# Patient Record
Sex: MALE | Race: WHITE | NOT HISPANIC OR LATINO | Employment: FULL TIME | URBAN - METROPOLITAN AREA
[De-identification: names, ages, dates, MRNs, and addresses within clinical notes are randomized per-mention and may not be internally consistent; named-entity substitution may affect disease eponyms.]

---

## 2023-01-22 ENCOUNTER — HOSPITAL ENCOUNTER (EMERGENCY)
Facility: HOSPITAL | Age: 38
Discharge: HOME | End: 2023-01-22
Attending: EMERGENCY MEDICINE | Admitting: EMERGENCY MEDICINE
Payer: COMMERCIAL

## 2023-01-22 VITALS
TEMPERATURE: 98.5 F | HEIGHT: 70 IN | BODY MASS INDEX: 30.78 KG/M2 | SYSTOLIC BLOOD PRESSURE: 135 MMHG | DIASTOLIC BLOOD PRESSURE: 68 MMHG | WEIGHT: 215 LBS | OXYGEN SATURATION: 98 % | HEART RATE: 69 BPM | RESPIRATION RATE: 18 BRPM

## 2023-01-22 DIAGNOSIS — Z98.890 HISTORY OF ACHILLES TENDON REPAIR: ICD-10-CM

## 2023-01-22 DIAGNOSIS — M79.672 PAIN OF LEFT HEEL: Primary | ICD-10-CM

## 2023-01-22 DIAGNOSIS — Z47.89 CAST DISCOMFORT: ICD-10-CM

## 2023-01-22 PROCEDURE — 99282 EMERGENCY DEPT VISIT SF MDM: CPT

## 2023-01-22 NOTE — DISCHARGE INSTRUCTIONS
Follow up with ortho in the a.m.    Continue your current pain meds.    Loosen splint as discussed.

## 2023-01-22 NOTE — ED PROVIDER NOTES
Subjective   History of Present Illness  Eyal Alaniz is a 37 yr old male presents emergency department for complaints of discomfort to his left heel.  Patient advises he had left Achilles surgery on January 12.  Surgical procedure was performed per Dr. Haley at [facility] orthopedics.  Patient complains of pain in his heel he describes it as a burning sensation to the heel.  Thursday the patient had a follow-up appointment.  Splint was taken off at this time.  Wound was examined there was no sign of infection.  When the splint was reapplied he believes it was applied too tight.  Patient has reported discomfort to the site since.  Patient has been taken ibuprofen, aspirin and hydrocodone.  Patient denies any new injury.    History provided by:  Patient   used: No    Leg Pain  Lower extremity pain location: Lt heel pain.  Injury: no    Pain details:     Quality:  Burning    Severity:  Moderate    Timing:  Constant  Relieved by:  Nothing  Associated symptoms: no decreased ROM, no fever, no swelling and no tingling        Review of Systems   Constitutional: Negative for fever.   Musculoskeletal:        Lt heel pain   Neurological: Negative for weakness and numbness.   All other systems reviewed and are negative.      No past medical history on file.    No Known Allergies    No past surgical history on file.    No family history on file.    Social History     Socioeconomic History   • Marital status: Significant Other   Tobacco Use   • Smoking status: Never   • Smokeless tobacco: Never           Objective   Physical Exam  Vitals and nursing note reviewed.   Constitutional:       General: He is not in acute distress.     Appearance: Normal appearance. He is not ill-appearing.   HENT:      Head: Normocephalic and atraumatic.      Nose: Nose normal.      Mouth/Throat:      Mouth: Mucous membranes are moist.   Eyes:      Extraocular Movements: Extraocular movements intact.      Pupils:  Pupils are equal, round, and reactive to light.   Cardiovascular:      Rate and Rhythm: Normal rate.      Pulses: Normal pulses.   Pulmonary:      Effort: No respiratory distress.      Breath sounds: Normal breath sounds.   Musculoskeletal:      Cervical back: Normal range of motion.        Legs:    Skin:     General: Skin is warm and dry.      Capillary Refill: Capillary refill takes less than 2 seconds.   Neurological:      Mental Status: He is alert and oriented to person, place, and time.   Psychiatric:         Mood and Affect: Mood normal.         Procedures           ED Course  ED Course as of 01/22/23 2118   Sun Jan 22, 2023   1140 Patient had left Achilles tendon surgery on January 12.  Patient had a follow-up appointment on Thursday wound was examined there was no sign of infection.  Site was resplinted.  Patient believes that it was applied too tight.  We have loosened the splint while in the ED.  Patient feels better at this time.  Wound has been examined.  There is no sign of infection.  Differential DX:  wound infection, cast discomfort. Post-op pain.  [KG]   1210 On-call physician Dr. Bridgett Pearl for [facility] orthopedics was consulted.  Discussed patient presentation and symptoms of increasing discomfort.  He is aware of no signs of infection to wound.  He suggest loosening and reapplying the patient's current splint as previously molded per Dr. Haley patient to follow-up in the morning with Dr. Haley.  [KG]      ED Course User Index  [KG] Josiane Benz C, APRN           No results found for this or any previous visit (from the past 24 hour(s)).  Note: In addition to lab results from this visit, the labs listed above may include labs taken at another facility or during a different encounter within the last 24 hours. Please correlate lab times with ED admission and discharge times for further clarification of the services performed during this visit.    No orders to display     Vitals:     "01/22/23 1106 01/22/23 1332   BP: 141/61 135/68   BP Location: Left arm Right arm   Patient Position: Sitting Lying   Pulse: 68 69   Resp: 20 18   Temp: 98.5 °F (36.9 °C)    TempSrc: Oral    SpO2: 96% 98%   Weight: 97.5 kg (215 lb)    Height: 177.8 cm (70\")      Medications - No data to display  ECG/EMG Results (last 24 hours)     ** No results found for the last 24 hours. **        No orders to display                                       MDM    Final diagnoses:   Pain of left heel   History of Achilles tendon repair   Cast discomfort       ED Disposition  ED Disposition     ED Disposition   Discharge    Condition   Stable    Comment   --             Aiden Haley, DPM  [address] [facility]  [address] [place] [address]  [phone]               Medication List      No changes were made to your prescriptions during this visit.          Josiane Benz, APRN  01/22/23 2118    "

## 2025-04-28 ENCOUNTER — TELEPHONE (OUTPATIENT)
Dept: GASTROENTEROLOGY | Facility: CLINIC | Age: 40
End: 2025-04-28

## 2025-04-28 NOTE — TELEPHONE ENCOUNTER
FYI-PATIENT NEEDS TO SCHEDULED A COLONOSCOPY WITH DR LEYVA -PER DR LEYVA. HE WILL NEED TO COME IN EITHER ON 05/05/2025 05/08/2025 AND IF HE CAN NOT HE WILL BE OPEN FOR ANYTIME IN JUNE.

## 2025-04-29 RX ORDER — SODIUM, POTASSIUM,MAG SULFATES 17.5-3.13G
1 SOLUTION, RECONSTITUTED, ORAL ORAL TAKE AS DIRECTED
Qty: 354 ML | Refills: 0 | Status: SHIPPED | OUTPATIENT
Start: 2025-04-29 | End: 2025-05-05

## 2025-05-05 RX ORDER — SODIUM, POTASSIUM,MAG SULFATES 17.5-3.13G
1 SOLUTION, RECONSTITUTED, ORAL ORAL TAKE AS DIRECTED
Qty: 354 ML | Refills: 0 | Status: SHIPPED | OUTPATIENT
Start: 2025-05-05

## 2025-05-13 ENCOUNTER — RESULTS FOLLOW-UP (OUTPATIENT)
Dept: GASTROENTEROLOGY | Facility: CLINIC | Age: 40
End: 2025-05-13
Payer: COMMERCIAL

## 2025-05-13 ENCOUNTER — TELEPHONE (OUTPATIENT)
Dept: GASTROENTEROLOGY | Facility: CLINIC | Age: 40
End: 2025-05-13
Payer: COMMERCIAL

## 2025-05-13 DIAGNOSIS — K51.919 ULCERATIVE COLITIS WITH COMPLICATION, UNSPECIFIED LOCATION: Primary | ICD-10-CM

## 2025-05-13 RX ORDER — MESALAMINE 1000 MG/1
1000 SUPPOSITORY RECTAL NIGHTLY
Qty: 30 SUPPOSITORY | Refills: 0 | Status: SHIPPED | OUTPATIENT
Start: 2025-05-13 | End: 2025-05-13

## 2025-05-13 RX ORDER — MESALAMINE 1000 MG/1
1000 SUPPOSITORY RECTAL NIGHTLY
Qty: 30 SUPPOSITORY | Refills: 0 | Status: SHIPPED | OUTPATIENT
Start: 2025-05-13 | End: 2025-06-24

## 2025-05-13 RX ORDER — MESALAMINE 1.2 G/1
3.6 TABLET, DELAYED RELEASE ORAL
Qty: 270 TABLET | Refills: 3 | Status: SHIPPED | OUTPATIENT
Start: 2025-05-13

## 2025-05-13 RX ORDER — MESALAMINE 1.2 G/1
3.6 TABLET, DELAYED RELEASE ORAL
Qty: 270 TABLET | Refills: 3 | Status: SHIPPED | OUTPATIENT
Start: 2025-05-13 | End: 2025-05-13